# Patient Record
Sex: FEMALE | Race: WHITE | URBAN - METROPOLITAN AREA
[De-identification: names, ages, dates, MRNs, and addresses within clinical notes are randomized per-mention and may not be internally consistent; named-entity substitution may affect disease eponyms.]

---

## 2018-05-29 ENCOUNTER — APPOINTMENT (OUTPATIENT)
Age: 23
Setting detail: DERMATOLOGY
End: 2018-05-30

## 2018-05-29 DIAGNOSIS — L40.4 GUTTATE PSORIASIS: ICD-10-CM

## 2018-05-29 DIAGNOSIS — L81.0 POSTINFLAMMATORY HYPERPIGMENTATION: ICD-10-CM

## 2018-05-29 PROCEDURE — 99203 OFFICE O/P NEW LOW 30 MIN: CPT

## 2018-05-29 PROCEDURE — OTHER COUNSELING: OTHER

## 2018-05-29 PROCEDURE — OTHER TREATMENT REGIMEN: OTHER

## 2018-05-29 PROCEDURE — OTHER MIPS QUALITY: OTHER

## 2018-05-29 ASSESSMENT — LOCATION SIMPLE DESCRIPTION DERM
LOCATION SIMPLE: LEFT FOREARM
LOCATION SIMPLE: RIGHT PRETIBIAL REGION
LOCATION SIMPLE: ABDOMEN
LOCATION SIMPLE: RIGHT UPPER ARM
LOCATION SIMPLE: LEFT UPPER BACK
LOCATION SIMPLE: LEFT UPPER ARM

## 2018-05-29 ASSESSMENT — LOCATION ZONE DERM
LOCATION ZONE: LEG
LOCATION ZONE: TRUNK
LOCATION ZONE: ARM

## 2018-05-29 ASSESSMENT — LOCATION DETAILED DESCRIPTION DERM
LOCATION DETAILED: LEFT ANTERIOR DISTAL UPPER ARM
LOCATION DETAILED: LEFT VENTRAL PROXIMAL FOREARM
LOCATION DETAILED: EPIGASTRIC SKIN
LOCATION DETAILED: LEFT SUPERIOR UPPER BACK
LOCATION DETAILED: RIGHT ANTERIOR PROXIMAL UPPER ARM
LOCATION DETAILED: RIGHT DISTAL PRETIBIAL REGION

## 2018-05-29 NOTE — PROCEDURE: TREATMENT REGIMEN
Plan: Patient recently moved back here from [place]. She had been getting weekly MTX injections at her Dermatologist in [place] and it is helping a lot. She also was getting light box treatments. 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 I did tell her that unfortunately we cannot offer her the MTX weekly injections or the light box treatments. She understood. I did consult Dr. Dianna Valles, my supervising physician, and she sated she should try Dr. Jackie Sandoval for the MTX injections. I gave her the information for Dr. Sandoval's clinic. I asked her if she had  her records from her [place] [facility] office and she stated no. She stated they told her that the clinic she follows up with would need to request them. She seemed a bit frustrated.She signed a record release form to get her records sent over here and then we also had her sign a record release for to get her records from us as well (i.e. released to herself). Once the records are sent to us I can then release them to her. I did briefly talk to her about other Biologic medications however she would like to continue her current medication. \\nThe patient also declined any topical steroid today. She stated she has tried several in the past and they never worked. She stated she even was on Cyclosporine at one time (briefly) however she had to discontinue it due to stomach upset issues\\n Other Instructions: Patient recently moved back here from [place]. She had been getting weekly MTX injections at her Dermatologist in [place] and it is helping a lot. She also was getting light box treatments. 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 I did tell her that unfortunately we cannot offer her the MTX weekly injections or the light box treatments. She understood. I did consult Dr. Dianna Valles, my supervising physician, and she sated she should try Dr. Jackie Sandoval for the MTX injections. I gave her the information for Dr. Sandoval's clinic. I asked her if she had  her records from her [place] [facility] office and she stated no. She stated they told her that the clinic she follows up with would need to request them. She seemed a bit frustrated.She signed a record release form to get her records sent over here and then we also had her sign a record release for to get her records from us as well (i.e. released to herself). Once the records are sent to us I can then release them to her. I did briefly talk to her about other Biologic medications however she would like to continue her current medication. \\nThe patient also declined any topical steroid today. She stated she has tried several in the past and they never worked. She stated she even was on Cyclosporine at one time (briefly) however she had to discontinue it due to stomach upset issues\\n

## 2018-05-29 NOTE — HPI: RASH (PSORIASIS)
Do You Have A Family History Of Psoriasis?: no
How Severe Is Your Psoriasis?: mild
Is This A New Presentation, Or A Follow-Up?: Psoriasis
Additional History: Pt state she moved from [place] and just finished school Pt has been doing methotrexate injections every week,(had injection last Monday)pt started 6 weeks ago and also doing the light therapy and is doing it twice a weeks, and is doing great and would like to continue the therapy. Pt states she is going [facility] afternoon for light box therapy

## 2019-08-26 NOTE — PROCEDURE: TREATMENT REGIMEN
I spoke to the patient and have ordered a fine-needle aspiration thyroid biopsy  She will  the order form  Detail Level: Zone